# Patient Record
Sex: MALE | Race: OTHER | URBAN - METROPOLITAN AREA
[De-identification: names, ages, dates, MRNs, and addresses within clinical notes are randomized per-mention and may not be internally consistent; named-entity substitution may affect disease eponyms.]

---

## 2017-06-04 ENCOUNTER — EMERGENCY (EMERGENCY)
Facility: HOSPITAL | Age: 57
LOS: 1 days | Discharge: HOME | End: 2017-06-04
Attending: EMERGENCY MEDICINE
Payer: SELF-PAY

## 2017-06-04 DIAGNOSIS — F10.10 ALCOHOL ABUSE, UNCOMPLICATED: ICD-10-CM
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
## 2017-06-04 DIAGNOSIS — M25.512 PAIN IN LEFT SHOULDER: ICD-10-CM
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
## 2017-06-04 LAB
APTT BLD: 32.8 SEC — SIGNIFICANT CHANGE UP (ref 27.5–37.4)
BASOPHILS # BLD AUTO: 0 K/UL — SIGNIFICANT CHANGE UP (ref 0–0.2)
BASOPHILS NFR BLD AUTO: 0.5 % — SIGNIFICANT CHANGE UP (ref 0–2)
EOSINOPHIL # BLD AUTO: 0.3 K/UL — SIGNIFICANT CHANGE UP (ref 0–0.5)
EOSINOPHIL NFR BLD AUTO: 4.1 % — SIGNIFICANT CHANGE UP (ref 0–6)
HCT VFR BLD CALC: 40.8 % — LOW (ref 42–52)
HGB BLD-MCNC: 13.7 G/DL — LOW (ref 14–18)
INR BLD: 0.94 RATIO — SIGNIFICANT CHANGE UP (ref 0.88–1.16)
LYMPHOCYTES # BLD AUTO: 3.6 K/UL — SIGNIFICANT CHANGE UP (ref 1–4.8)
LYMPHOCYTES # BLD AUTO: 44.1 % — SIGNIFICANT CHANGE UP (ref 20–55)
MCHC RBC-ENTMCNC: 29.8 PG — SIGNIFICANT CHANGE UP (ref 27–31)
MCHC RBC-ENTMCNC: 33.6 G/DL — SIGNIFICANT CHANGE UP (ref 32–36)
MCV RBC AUTO: 88.9 FL — SIGNIFICANT CHANGE UP (ref 80–94)
MONOCYTES # BLD AUTO: 1.4 K/UL — HIGH (ref 0–0.8)
MONOCYTES NFR BLD AUTO: 17.3 % — HIGH (ref 3–10)
NEUTROPHILS # BLD AUTO: 2.7 K/UL — SIGNIFICANT CHANGE UP (ref 1.8–8)
NEUTROPHILS NFR BLD AUTO: 33.6 % — LOW (ref 37–73)
PLATELET # BLD AUTO: 214 K/UL — SIGNIFICANT CHANGE UP (ref 150–400)
PROTHROM AB SERPL-ACNC: 10.3 SEC — SIGNIFICANT CHANGE UP (ref 9.8–12.7)
RBC # BLD: 4.59 M/UL — LOW (ref 4.6–6.2)
RBC # FLD: 16.2 % — HIGH (ref 11–15.6)
WBC # BLD: 8 K/UL — SIGNIFICANT CHANGE UP (ref 4.8–10.8)
WBC # FLD AUTO: 8 K/UL — SIGNIFICANT CHANGE UP (ref 4.8–10.8)

## 2017-06-04 PROCEDURE — 99053 MED SERV 10PM-8AM 24 HR FAC: CPT

## 2017-06-04 PROCEDURE — 71010: CPT | Mod: 26

## 2017-06-04 PROCEDURE — 99284 EMERGENCY DEPT VISIT MOD MDM: CPT | Mod: 25

## 2017-06-04 PROCEDURE — 72170 X-RAY EXAM OF PELVIS: CPT | Mod: 26

## 2017-06-04 RX ORDER — SODIUM CHLORIDE 9 MG/ML
1000 INJECTION INTRAMUSCULAR; INTRAVENOUS; SUBCUTANEOUS ONCE
Qty: 0 | Refills: 0 | Status: COMPLETED | OUTPATIENT
Start: 2017-06-04 | End: 2017-06-04

## 2017-06-04 NOTE — H&P ADULT - PROBLEM SELECTOR PLAN 1
f/u scans (Head/ A/P, chest, xray pelvis, left shoulder xray)  keep NPO IVFs  remote telemetry  Continuous pulse ox  Neurochecks q two hours  Ortho contacted @ approximately 12AM, will get left shoulder xrays and sedate patient if dislocation of shoulder is evidenced on imaging.   Pain control prn
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
## 2017-06-04 NOTE — ED PROVIDER NOTE - NS ED MD SCRIBE ATTENDING SCRIBE SECTIONS
VITAL SIGNS( Pullset)/REVIEW OF SYSTEMS/INTAKE ASSESSMENT/SCREENINGS/HISTORY OF PRESENT ILLNESS/RESULTS/PAST MEDICAL/SURGICAL/SOCIAL HISTORY/HIV/DISPOSITION/PHYSICAL EXAM/PROGRESS NOTE/CONSULTATIONS/SHIFT CHANGE
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
## 2017-06-04 NOTE — ED PROVIDER NOTE - NEUROLOGICAL, MLM
Alert and oriented, no focal deficits, no motor or sensory deficits. no motor or sensory deficits. Moves all extremities.
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
## 2017-06-05 VITALS
RESPIRATION RATE: 18 BRPM | OXYGEN SATURATION: 97 % | TEMPERATURE: 99 F | HEART RATE: 94 BPM | DIASTOLIC BLOOD PRESSURE: 69 MMHG | SYSTOLIC BLOOD PRESSURE: 126 MMHG

## 2017-06-05 VITALS
DIASTOLIC BLOOD PRESSURE: 80 MMHG | HEART RATE: 95 BPM | RESPIRATION RATE: 18 BRPM | TEMPERATURE: 99 F | OXYGEN SATURATION: 97 % | SYSTOLIC BLOOD PRESSURE: 117 MMHG
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
## 2017-06-05 LAB
ABO RH CONFIRMATION: SIGNIFICANT CHANGE UP
ALBUMIN SERPL ELPH-MCNC: 4.2 G/DL — SIGNIFICANT CHANGE UP (ref 3.3–5.2)
ALP SERPL-CCNC: 90 U/L — SIGNIFICANT CHANGE UP (ref 40–120)
ALT FLD-CCNC: 71 U/L — HIGH
ANION GAP SERPL CALC-SCNC: 14 MMOL/L — SIGNIFICANT CHANGE UP (ref 5–17)
APPEARANCE UR: CLEAR — SIGNIFICANT CHANGE UP
AST SERPL-CCNC: 56 U/L — HIGH
BACTERIA # UR AUTO: NEGATIVE — SIGNIFICANT CHANGE UP
BILIRUB SERPL-MCNC: 0.2 MG/DL — LOW (ref 0.4–2)
BILIRUB UR-MCNC: NEGATIVE — SIGNIFICANT CHANGE UP
BLD GP AB SCN SERPL QL: SIGNIFICANT CHANGE UP
BUN SERPL-MCNC: 9 MG/DL — SIGNIFICANT CHANGE UP (ref 8–20)
CALCIUM SERPL-MCNC: 8 MG/DL — LOW (ref 8.6–10.2)
CHLORIDE SERPL-SCNC: 107 MMOL/L — SIGNIFICANT CHANGE UP (ref 98–107)
CO2 SERPL-SCNC: 22 MMOL/L — SIGNIFICANT CHANGE UP (ref 22–29)
COLOR SPEC: YELLOW — SIGNIFICANT CHANGE UP
CREAT SERPL-MCNC: 0.81 MG/DL — SIGNIFICANT CHANGE UP (ref 0.5–1.3)
DIFF PNL FLD: NEGATIVE — SIGNIFICANT CHANGE UP
EPI CELLS # UR: SIGNIFICANT CHANGE UP
ETHANOL SERPL-MCNC: 25 MG/DL — SIGNIFICANT CHANGE UP
ETHANOL SERPL-MCNC: 382 MG/DL — SIGNIFICANT CHANGE UP
GLUCOSE SERPL-MCNC: 129 MG/DL — HIGH (ref 70–115)
GLUCOSE UR QL: NEGATIVE MG/DL — SIGNIFICANT CHANGE UP
KETONES UR-MCNC: NEGATIVE — SIGNIFICANT CHANGE UP
LEUKOCYTE ESTERASE UR-ACNC: NEGATIVE — SIGNIFICANT CHANGE UP
LIDOCAIN IGE QN: 44 U/L — SIGNIFICANT CHANGE UP (ref 22–51)
NITRITE UR-MCNC: NEGATIVE — SIGNIFICANT CHANGE UP
PH UR: 5 — SIGNIFICANT CHANGE UP (ref 5–8)
POTASSIUM SERPL-MCNC: 3.7 MMOL/L — SIGNIFICANT CHANGE UP (ref 3.5–5.3)
POTASSIUM SERPL-SCNC: 3.7 MMOL/L — SIGNIFICANT CHANGE UP (ref 3.5–5.3)
PROT SERPL-MCNC: 7.3 G/DL — SIGNIFICANT CHANGE UP (ref 6.6–8.7)
PROT UR-MCNC: 15 MG/DL
RBC CASTS # UR COMP ASSIST: NEGATIVE /HPF — SIGNIFICANT CHANGE UP (ref 0–4)
SODIUM SERPL-SCNC: 143 MMOL/L — SIGNIFICANT CHANGE UP (ref 135–145)
SP GR SPEC: 1.02 — SIGNIFICANT CHANGE UP (ref 1.01–1.02)
TYPE + AB SCN PNL BLD: SIGNIFICANT CHANGE UP
URATE CRY FLD QL MICRO: ABNORMAL
UROBILINOGEN FLD QL: NEGATIVE MG/DL — SIGNIFICANT CHANGE UP
WBC UR QL: NEGATIVE — SIGNIFICANT CHANGE UP

## 2017-06-05 PROCEDURE — 74177 CT ABD & PELVIS W/CONTRAST: CPT | Mod: 26

## 2017-06-05 PROCEDURE — 72125 CT NECK SPINE W/O DYE: CPT

## 2017-06-05 PROCEDURE — 80053 COMPREHEN METABOLIC PANEL: CPT

## 2017-06-05 PROCEDURE — 86850 RBC ANTIBODY SCREEN: CPT

## 2017-06-05 PROCEDURE — 85610 PROTHROMBIN TIME: CPT

## 2017-06-05 PROCEDURE — 73030 X-RAY EXAM OF SHOULDER: CPT | Mod: 26,77,LT

## 2017-06-05 PROCEDURE — 99284 EMERGENCY DEPT VISIT MOD MDM: CPT | Mod: 25

## 2017-06-05 PROCEDURE — 36415 COLL VENOUS BLD VENIPUNCTURE: CPT

## 2017-06-05 PROCEDURE — 71260 CT THORAX DX C+: CPT

## 2017-06-05 PROCEDURE — 71045 X-RAY EXAM CHEST 1 VIEW: CPT

## 2017-06-05 PROCEDURE — 83690 ASSAY OF LIPASE: CPT

## 2017-06-05 PROCEDURE — 70450 CT HEAD/BRAIN W/O DYE: CPT

## 2017-06-05 PROCEDURE — 71260 CT THORAX DX C+: CPT | Mod: 26

## 2017-06-05 PROCEDURE — 86901 BLOOD TYPING SEROLOGIC RH(D): CPT

## 2017-06-05 PROCEDURE — 83605 ASSAY OF LACTIC ACID: CPT

## 2017-06-05 PROCEDURE — 81001 URINALYSIS AUTO W/SCOPE: CPT

## 2017-06-05 PROCEDURE — 80307 DRUG TEST PRSMV CHEM ANLYZR: CPT

## 2017-06-05 PROCEDURE — 72170 X-RAY EXAM OF PELVIS: CPT

## 2017-06-05 PROCEDURE — 73030 X-RAY EXAM OF SHOULDER: CPT | Mod: 26,LT

## 2017-06-05 PROCEDURE — 86900 BLOOD TYPING SEROLOGIC ABO: CPT

## 2017-06-05 PROCEDURE — 85730 THROMBOPLASTIN TIME PARTIAL: CPT

## 2017-06-05 PROCEDURE — 72125 CT NECK SPINE W/O DYE: CPT | Mod: 26

## 2017-06-05 PROCEDURE — 74177 CT ABD & PELVIS W/CONTRAST: CPT

## 2017-06-05 PROCEDURE — 85027 COMPLETE CBC AUTOMATED: CPT

## 2017-06-05 PROCEDURE — 73030 X-RAY EXAM OF SHOULDER: CPT

## 2017-06-05 PROCEDURE — 70450 CT HEAD/BRAIN W/O DYE: CPT | Mod: 26

## 2017-06-05 RX ADMIN — SODIUM CHLORIDE 1000 MILLILITER(S): 9 INJECTION INTRAMUSCULAR; INTRAVENOUS; SUBCUTANEOUS at 01:48

## 2017-06-05 NOTE — CONSULT NOTE ADULT - ASSESSMENT
A/P:  Pt is a  57y Male with Patient is a 57y old  Male who presents with a chief complaint of left shoulder pain  found to have a left shoulder dislocation     PLAN:   1. Will need reduction,   2. NWB LUE  3. Outpatient follow up   4. Will follow up CT for further injuries
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
## 2017-06-05 NOTE — PROGRESS NOTE ADULT - SUBJECTIVE AND OBJECTIVE BOX
JOINT REDUCTION  PROCEDURE NOTE: Joint reduction     Performed by: Matt Pitt PA-C    Indication: Dislocation of Right Shoulder     Consent: The risks and benefits of the procedure including incomplete reduction, secondary fracture, nerve damage and bleeding were explained via  and the patient verbalized their understanding and wished to proceed with the procedure. Verbal consent was obtained following the discussion.  Patient states could not sign due to C collar and having pain     Universal Protocol: a time out was performed and the correct patient and site were verified     Procedure: Neurovascular exam intact prior to joint reduction.  Skin exam: no bleeding or lacerations at the fracture site. Conscious sedation performed by emergency department attending physician. Anesthesia/pain control, using aseptic technique, was administered using a hematoma block of [  ] ml of 1% lidocaine. Reduction of the [left/right] [joint] was accomplished via [   ].  The joint was immobilized with sling / splint.  Distally, the extremity was neurovascular intact following the procedure.  The patient tolerated the procedure well.    Post reduction films obtained and demonstrated an adequate reduction.    Complications: None JOINT REDUCTION  PROCEDURE NOTE: Joint reduction     Performed by: Matt Pitt PA-C    Indication: Dislocation of Right Shoulder     Consent: The risks and benefits of the procedure including incomplete reduction, secondary fracture, nerve damage and bleeding were explained via  and the patient verbalized their understanding and wished to proceed with the procedure. Verbal consent was obtained following the discussion.  Patient states could not sign due to C collar and having pain     Universal Protocol: a time out was performed and the correct patient and site were verified     Procedure: Neurovascular exam intact prior to joint reduction.  Skin exam: no bleeding or lacerations at the fracture site. Conscious sedation performed by emergency department attending physician.. Reduction of the left shoulder was accomplished via traction/counter traction.  The joint was immobilized with sling.  Distally, the extremity was neurovascular intact following the procedure.  The patient tolerated the procedure well.    Post reduction films obtained and demonstrated an adequate reduction.    Complications: None
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
## 2018-04-30 NOTE — H&P ADULT - SYSTOLIC B/P
Call to pt, message left to call back to discuss premedication for June appt.    (Systolic B/P) 4= >89